# Patient Record
Sex: MALE | Race: BLACK OR AFRICAN AMERICAN | Employment: UNEMPLOYED | URBAN - METROPOLITAN AREA
[De-identification: names, ages, dates, MRNs, and addresses within clinical notes are randomized per-mention and may not be internally consistent; named-entity substitution may affect disease eponyms.]

---

## 2019-02-01 NOTE — TELEPHONE ENCOUNTER
FUTURE VISIT INFORMATION      FUTURE VISIT INFORMATION:    Date: 2/4/19    Time: 9:50AM    Location: [facility]   REFERRAL INFORMATION:    Referring provider:  Moe Bansal (NP),     Referring providers clinic:  [facility]    Reason for visit/diagnosis  Chronic throat pain    RECORDS REQUESTED FROM:       Clinic name Comments Records Status Imaging Status                                           2/1/19 8:04AM sent a fax to [facility] for records, did not receive the records that were faxed 1/8/19. Sending an additional fax to [facility] for records via [employer] - Amay     2/4/19 10AM called [facility] and left a message with medical records to release records - Amay  2/4/19 10:54AM called [facility], waited on hold for 12 minutes to get a hold of a nurse who will be faxing records on patient for today's appointment. REcieved records via [employer] and handed off to the nurse - Amay

## 2019-02-04 ENCOUNTER — PRE VISIT (OUTPATIENT)
Dept: OTOLARYNGOLOGY | Facility: CLINIC | Age: 29
End: 2019-02-04

## 2019-02-04 ENCOUNTER — OFFICE VISIT (OUTPATIENT)
Dept: OTOLARYNGOLOGY | Facility: CLINIC | Age: 29
End: 2019-02-04
Payer: COMMERCIAL

## 2019-02-04 DIAGNOSIS — R07.0 CHRONIC THROAT PAIN: Primary | ICD-10-CM

## 2019-02-04 DIAGNOSIS — R07.0 THROAT PAIN: ICD-10-CM

## 2019-02-04 DIAGNOSIS — K21.9 GASTROESOPHAGEAL REFLUX DISEASE, ESOPHAGITIS PRESENCE NOT SPECIFIED: ICD-10-CM

## 2019-02-04 DIAGNOSIS — R49.0 DYSPHONIA: ICD-10-CM

## 2019-02-04 DIAGNOSIS — G89.29 CHRONIC THROAT PAIN: Primary | ICD-10-CM

## 2019-02-04 DIAGNOSIS — R13.10 DYSPHAGIA: Primary | ICD-10-CM

## 2019-02-04 RX ORDER — TAMSULOSIN HYDROCHLORIDE 0.4 MG/1
0.4 CAPSULE ORAL DAILY
Refills: 0 | COMMUNITY
Start: 2018-11-24

## 2019-02-04 RX ORDER — FLUTICASONE PROPIONATE 50 MCG
SPRAY, SUSPENSION (ML) NASAL
Refills: 3 | COMMUNITY
Start: 2019-01-03

## 2019-02-04 NOTE — Clinical Note
"2/4/2019       RE: Ken Hernandez  [address]  [place] [place] [facility] [address]     Dear Colleague,    Thank you for referring your patient, Ken Hernandez, to the [facility] at [facility]. Please see a copy of my visit note below.    Dear Dr. Bansal:    I had the pleasure of meeting Ken Hernandez in consultation at the [facility] of the [facility] at your request, for evaluation of throat discomfort. The note from our visit follows. Speech recognition software may have been used in the documentation below; input is reviewed before signature to the best of my ability. I appreciate the opportunity to participate in the care of this pleasant patient.    Please feel free to contact me with any questions.    Sincerely yours,    Rosanne Bowman M.D., M.P.H.  , Laryngology  Director, [facility]  Otolaryngology- Head & Neck Surgery  [phone]          =====    History of Present Illness:   Ken Hernandez is a pleasant 29 year old male who presents with a 1 year history of throat concerns. Symptoms include {[facility] ENT VOICESX MISONO:068254}.    Voice  No concerns.     Swallowing  ***He has some swallow discomfort that has been going on for about 1 year. This is worst with cold liquids and hard foods. He has stopped drinking cold liquids as a result. The symptoms are also worse after heavy voice use. Problem is worsening over time. Things never feel like they get down the wrong tube. No prior swallow study. No preceding trauma.  He {DOES/DOES NOT:358163::\"does not\"} experience increased swallowing effort with {TEXTURES:[id number]}.    Cough/Throat-clearing  No concerns.     Breathing  No concerns.     Throat discomfort  ***also has had throat discomfort over the same period of time  Feels sharp and is always there  Does seem worse with meals    Reflux-type symptoms  He " experiences heartburn/indigestion daily. He is not taking reflux medications.  He reports a year of a reflux medication with no change in his symptoms.      Prior outside records were reviewed for this visit.    MEDICATIONS:     Current Outpatient Medications   Medication Sig Dispense Refill     fluticasone (FLONASE) 50 MCG/ACT nasal spray PLACE 2 SPRAYS BY NASAL ROUTE ONCE D  3     tamsulosin (FLOMAX) 0.4 MG capsule Take 0.4 mg by mouth daily  0       ALLERGIES:  No Known Allergies    PAST MEDICAL HISTORY: No past medical history on file.     PAST SURGICAL HISTORY: No past surgical history on file.    HABITS/SOCIAL HISTORY:    Social History     Tobacco Use     Smoking status: Not on file   Substance Use Topics     Alcohol use: Not on file         FAMILY HISTORY:  No family history on file. Noncontributory.    REVIEW OF SYSTEMS:  The patient completed a comprehensive 11 point review of systems (below), which was reviewed. Positives are as noted below; pertinent findings are as noted in the HPI.     Patient Supplied Answers to Review of Systems  No flowsheet data found.    PHYSICAL EXAMINATION:  General: The patient was alert and conversant, and in no acute distress. Patient accompanied by [facility] .  Eyes: PERRL, conjunctiva and lids normal, sclera nonicteric.  Nose: Anterior rhinoscopy: no gross abnormalities. no  bleeding; no  mucopurulence; septum grossly normal, mild mucoid drainage and/or crusting.  Oral cavity/oropharynx: No masses or lesions. Dentition in fair condition. Floor of mouth and oral tongue soft to palpation. Tongue mobility and palate elevation intact and symmetric.  Ears: Normal auricles, external auditory canals bilaterally. Visualized portions of tympanic membranes normal bilaterally.   Neck: No palpable cervical lymphadenopathy. There was moderate  tenderness to palpation of the thyrohyoid space, which was very narrow. No obvious thyroid abnormality. Landmarks palpable.  Resp:  Breathing comfortably, no stridor or stertor.  Neuro: Symmetric facial function. Other cranial nerves as documented above.  Psych: Normal affect, pleasant and cooperative.  Voice/speech: No significant dysphonia.  Extremities: No cyanosis, clubbing, or edema of the upper extremities.    Intake scores  Total Score for Last Patient-Answered VHI Questionnaire  No flowsheet data found.  Total Score for Last Patient-Answered EAT Questionnaire  No flowsheet data found.    Total Score for Last Patient-Answered CSI Questionnaire  No flowsheet data found.      PROCEDURE:   Flexible fiberoptic laryngoscopy  Indications: This procedure was warranted to evaluate the patient's laryngeal anatomy and function. Risks, benefits, and alternatives were discussed.  Description: After written informed consent was obtained, a time-out was performed to confirm patient identity, procedure, and procedure site. Topical 3% lidocaine with 0.25% phenylephrine was applied to the nasal cavities. I performed the endoscopy and no complications were apparent.  Performed by: Rosanne Bowman MD MPH  SLP: Saira Ferreira, PhD, CCC-SLP   Findings: Normal nasopharynx. Normal base of tongue, valleculae, and epiglottis. The right true vocal fold demonstrated normal mobility. The left true vocal fold demonstrated normal mobility. The medial edges of the vocal folds appeared smooth and straight. No focal mucosal lesions were observed on the true vocal folds. Glissade produced appropriate elongation. There was moderate to severe supraglottic recruitment with connected speech. Mucosa of the false vocal folds, aryepiglottic folds, piriform sinuses, and posterior glottis unremarkable. Airway was patent.   No focal lesions on NBI.          IMPRESSION AND PLAN:   Ken Hernandez presents with chronic throat pain that is worse with swallowing cold liquids/solids and with voice use. On exam he has significant supraglottic hyperfunction. I recommended speech  therapy as initial primary management, with goals including reducing laryngeal irritability and improving laryngeal efficiency.     He also reported daily acid reflux symptoms in chest, did not respond to a year-long course of prior anti-reflux medications. I recommended a GI evaluation given this ongoing problem, which likely exacerbates his throat symptom as well.    He will return as needed. I appreciate the opportunity to participate in the care of this patient.             Again, thank you for allowing me to participate in the care of your patient.      Sincerely,    Rosanne Bowman MD

## 2019-02-04 NOTE — PATIENT INSTRUCTIONS
1.  You were seen in the ENT Clinic today by Dr. Bowman.  If you have any questions or concerns after your appointment, please call [phone].    2.  Plan is to return to clinic as needed.    Clinic contact information:   1. To schedule an appointment or to speak to someone regarding your medical care, please call [phone], option #1   2. Alisson RN:[phone]   3. Surgery scheduling (if applicable):       Alyssa Garay: [phone]    4. Fax: [phone]   5. [phone]

## 2019-02-04 NOTE — LETTER
2/4/2019      RE: Ken Hernandez  [address]  [facility] [address]       Dear Dr. Bansal:    I had the pleasure of meeting Ken Hernandez in consultation at the [facility] of the [facility] at your request, for evaluation of throat discomfort. The note from our visit follows. Speech recognition software may have been used in the documentation below; input is reviewed before signature to the best of my ability. I appreciate the opportunity to participate in the care of this pleasant patient.    =====    History of Present Illness:   Ken Hernandez is a pleasant 29-year-old male who presents with a one year history of throat concerns.       Voice  No concerns.       Swallowing  He has some swallow discomfort that has been going on for about one year. This is worse with cold liquids and hard foods. He has stopped drinking cold liquids as a result. The symptoms are also worse after heavy voice use. He notes the problem is worsening over time. Things never feel like they get down the wrong tube. He has had no prior swallow study. No preceding trauma.      Cough/Throat-clearing  No concerns.       Breathing  No concerns.       Throat discomfort  He also reports he has had throat discomfort over the same period of time.  It feels sharp and is always there.  He notes it seems worse with meals.       Reflux-type symptoms  He experiences heartburn/indigestion daily. He is not taking reflux medications.  He reports a year of a reflux medication with no change in his symptoms.      Prior outside records were reviewed for this visit.      MEDICATIONS:     Current Outpatient Medications   Medication Sig Dispense Refill     fluticasone (FLONASE) 50 MCG/ACT nasal spray PLACE 2 SPRAYS BY NASAL ROUTE ONCE D  3     tamsulosin (FLOMAX) 0.4 MG capsule Take 0.4 mg by mouth daily  0       ALLERGIES:  No Known Allergies    PAST MEDICAL HISTORY: No past medical history on file.     PAST  SURGICAL HISTORY: No past surgical history on file.    HABITS/SOCIAL HISTORY:    Social History     Tobacco Use     Smoking status: Not on file   Substance Use Topics     Alcohol use: Not on file         FAMILY HISTORY:  No family history on file. Noncontributory.    REVIEW OF SYSTEMS:  The patient completed a comprehensive 11 point review of systems (below), which was reviewed. Positives are as noted below; pertinent findings are as noted in the HPI.     Patient Supplied Answers to Review of Systems  No flowsheet data found.    PHYSICAL EXAMINATION:  General: The patient was alert and conversant, and in no acute distress. Patient accompanied by [facility] .  Eyes: PERRL, conjunctiva and lids normal, sclera nonicteric.  Nose: Anterior rhinoscopy: no gross abnormalities. no  bleeding; no  mucopurulence; septum grossly normal, mild mucoid drainage and/or crusting.  Oral cavity/oropharynx: No masses or lesions. Dentition in fair condition. Floor of mouth and oral tongue soft to palpation. Tongue mobility and palate elevation intact and symmetric.  Ears: Normal auricles, external auditory canals bilaterally. Visualized portions of tympanic membranes normal bilaterally.   Neck: No palpable cervical lymphadenopathy. There was moderate  tenderness to palpation of the thyrohyoid space, which was very narrow. No obvious thyroid abnormality. Landmarks palpable.  Resp: Breathing comfortably, no stridor or stertor.  Neuro: Symmetric facial function. Other cranial nerves as documented above.  Psych: Normal affect, pleasant and cooperative.  Voice/speech: No significant dysphonia.  Extremities: No cyanosis, clubbing, or edema of the upper extremities.    Intake scores  Total Score for Last Patient-Answered VHI Questionnaire  No flowsheet data found.  Total Score for Last Patient-Answered EAT Questionnaire  No flowsheet data found.    Total Score for Last Patient-Answered CSI Questionnaire  No flowsheet data  found.      PROCEDURE:   Flexible fiberoptic laryngoscopy  Indications: This procedure was warranted to evaluate the patient's laryngeal anatomy and function. Risks, benefits, and alternatives were discussed.  Description: After written informed consent was obtained, a time-out was performed to confirm patient identity, procedure, and procedure site. Topical 3% lidocaine with 0.25% phenylephrine was applied to the nasal cavities. I performed the endoscopy and no complications were apparent.  Performed by: Rosanne Bowman MD MPH  SLP: Saira Ferreira, PhD, CCC-SLP   Findings: Normal nasopharynx. Normal base of tongue, valleculae, and epiglottis. The right true vocal fold demonstrated normal mobility. The left true vocal fold demonstrated normal mobility. The medial edges of the vocal folds appeared smooth and straight. No focal mucosal lesions were observed on the true vocal folds. Glissade produced appropriate elongation. There was moderate to severe supraglottic recruitment with connected speech. Mucosa of the false vocal folds, aryepiglottic folds, piriform sinuses, and posterior glottis unremarkable. Airway was patent.   No focal lesions on NBI.          IMPRESSION AND PLAN:   Ken Hernandez presents with chronic throat pain that is worse with swallowing cold liquids/solids and with voice use. On exam, he has significant supraglottic hyperfunction.     I recommended speech therapy as initial primary management, with goals including reducing laryngeal irritability and improving laryngeal efficiency.     He also reported daily acid reflux symptoms in his chest that did not respond to a year-long course of prior anti-reflux medications. I recommended a Gastroenterology evaluation given this ongoing problem, which likely exacerbates his throat symptom as well.    He will return as needed. I appreciate the opportunity to participate in the care of this patient.       Rosanne Bowman MD

## 2019-02-04 NOTE — LETTER
2/4/2019       RE: Ken Hernandez  [address]  [place] [address]     Dear Colleague,    Thank you for referring your patient, Ken Hernandez, to the [facility]. Please see a copy of my visit note below.    [facility]  Patrick Peters Jr., M.D., F.A.C.S.  Rosanne Bowman M.D., M.P.H.  Saira Ferreira, Ph.D., CCC/SLP  Marianna Infante M.M. (voice), M.A., CCC/SLP  Blu Marina M.M. (voice), M.A., CCC/SLP    [facility]  VOICE/SPEECH/BREATHING EVALUATION AND LARYNGEAL EXAMINATION REPORT    Patient: Ken Hernandez  Date of Visit: 2/4/2019    Clinician: Saira Ferreira, Ph.D., CCC/SLP  Seen in conjunction with: Dr. Bowman    CHIEF COMPLAINT:  swallow    HISTORY  Ken Hernandez is a 29 year old presenting today for evaluation of throat concerns, including effortful swallowing.    Please refer to Dr. Bowman s dictation for a more complete history and impressions.   Salient details of his history are as follows:    Onset: a year ago, no obvious precipitating event    swallowing is uncomfortable and effortful, mostly with cold liquids and some solids    No actual aspiration    Throat discomfort; worse with meals, unrelated to voice use    DIAGNOSIS/REASON FOR REFERRAL  Dysphagia/ Evaluate, perform laryngeal exam, treat as appropriate      CURRENT PATIENT COMPLAINTS    Primary: swallow    Secondary: throat pain    Denies significant dyspnea, dysphonia, or pain    OTHER PERTINENT HISTORY    He does have symptoms of reflux, and has not had a full GI evaluation    Otherwise unknown; please refer to Dr. Bowman's note    OBJECTIVE FINDINGS  VOICE/ SPEECH/ NON-COMMUNICATIVE LARYNGEAL BEHAVIORS EVALUATION  An evaluation of the voice/throat was accomplished today; salient features are as follows:    Palpation of the laryngeal area shows:    No tenderness of the thyrohyoid area     Severely reduced thyrohyoid space     Additional closure of the  thyrohyoid space on phonation is not possible    Breathing pattern:    Appears within normal limits and adequate     Tension is evident:    Neck    Voice quality is characterized by    Backward resonance, with apparent tongue-base tension    Habitual pitch was not formally tested, but is judged to be WNL and appropriate    Loudness is WNL and appropriate for the setting    Sustained vowels: WNL    In a pitch glide task to determine pitch range he shows normal pitch range with some prompting    LARYNGEAL EXAMINATION    Endoscopic laryngeal examination was performed by:  Rosanne Bowman MD,   I provided technical support, and provided the protocol of instructions for the patient.  Type of exam:   Flexible endoscopy with chip-tip technology  The laryngeal and pharyngeal structures were evaluated for gross appearance, mobility, function, and focal lesions / abnormalities of the associated mucosa.    All findings were within normal limits with the exception of the following salient features:     Moderate presence of thickened secretions throughout the laryngeal area    Status of vocal fold mucosa:   o Mildly dry  o Otherwise within normal limits, with no lesions and straight vibratory margins    Incomplete abduction is evident during a task of 20 quickly repeated vowels  o Airway is WNL, but never as wide as it could be  o Breathing appears somewhat labored or effortful    Moderate to marked four-way constriction of the supraglottic larynx during connected speech    Too uncomfortable with the exam to do therapy probes; language also a barrier    Dr. Bowman and I reviewed this laryngeal exam with Mr. Hernandez today, and I provided pertinent explanations, as well as written information:    Endoscopic findings are consistent with audio-perceptual assessment and patient Hx/complaints.    his symptoms are accounted for by hyperfunction and constriction in the supraglottic area associated with phonation, and possibly with  resting postures as well     Some symptoms may be caused by, or exacerbated by, reflux; a GI consult is recommended    Because there appears to be a functional component to his symptoms, he would most likely benefit from functional speech therapy.    He may opt to pursue the GI consult first    ASSESSMENT / PLAN  IMPRESSIONS:  This evaluation has resulted in the following diagnosis/diagnoses for Mr. Hernandez  R13.10 (Dysphagia)  R07.0 (Throat Pain)  R49.0 (Dysphonia).      Laryngeal evaluation demonstrated muscle tension and hyperfunction    Perceptual evaluation demonstrated backward resonance  Discomfort is partly accounted for by the hyperfunction of the intrinsic and extrinsic laryngeal musculature    Contributions from reflux are unknown; Dr. Bowman has recommended a GI consult   Because there appears to be a functional component to his symptoms, he would most likely benefit from a course of functional speech therapy  RECOMMENDATIONS:     A course of speech therapy is recommended to promote reduced discomfort and dysphagia (effortful swallow)    He demonstrates a Guarded prognosis for improvement given adherence to therapeutic recommendations. Therapeutic     Positive indicators: diagnosis is known to respond to functional treatment;     Negative indicators: language barrier; does not seem interested in therapy    TREATMENT PLAN  Speech therapy if offered but not planned; if he schedules therapy, goals will be established at the first session    TOTAL SERVICE TIME: 20 minutes  EVALUATION OF VOICE AND RESONANCE (06385)  NO CHARGE FACILITY FEE (87294)    Saira Ferreira, Ph.D., [facility]-SLP  Speech-Language Pathologist  Director, [facility]  [phone]

## 2019-02-04 NOTE — PROGRESS NOTES
[facility] VOICE [facility]  Patrick Peters Jr., M.D., F.A.C.S.  Rosanne Bowman M.D., M.P.H.  Saira Ferreira, Ph.D., CCC/SLP  Marianna Infante M.M. (voice), MJENN., CCC/SLP  Blu Marina M.M. (voice), M.ANNEL., CCC/SLP    [facility]  VOICE/SPEECH/BREATHING EVALUATION AND LARYNGEAL EXAMINATION REPORT    Patient: Ken Henrandez  Date of Visit: 2/4/2019    Clinician: Saira Ferreira, Ph.D., CCC/SLP  Seen in conjunction with: Dr. Bowman    CHIEF COMPLAINT:  swallow    HISTORY  Ken Hernandez is a 29 year old presenting today for evaluation of throat concerns, including effortful swallowing.    Please refer to Dr. Bowman s dictation for a more complete history and impressions.   Salient details of his history are as follows:    Onset: a year ago, no obvious precipitating event    swallowing is uncomfortable and effortful, mostly with cold liquids and some solids    No actual aspiration    Throat discomfort; worse with meals, unrelated to voice use    DIAGNOSIS/REASON FOR REFERRAL  Dysphagia/ Evaluate, perform laryngeal exam, treat as appropriate      CURRENT PATIENT COMPLAINTS    Primary: swallow    Secondary: throat pain    Denies significant dyspnea, dysphonia, or pain    OTHER PERTINENT HISTORY    He does have symptoms of reflux, and has not had a full GI evaluation    Otherwise unknown; please refer to Dr. Bowman's note      OBJECTIVE FINDINGS  VOICE/ SPEECH/ NON-COMMUNICATIVE LARYNGEAL BEHAVIORS EVALUATION  An evaluation of the voice/throat was accomplished today; salient features are as follows:    Palpation of the laryngeal area shows:    No tenderness of the thyrohyoid area     Severely reduced thyrohyoid space     Additional closure of the thyrohyoid space on phonation is not possible    Breathing pattern:    Appears within normal limits and adequate     Tension is evident:    Neck    Voice quality is characterized by    Backward resonance, with apparent tongue-base tension    Habitual pitch was not formally  tested, but is judged to be WNL and appropriate    Loudness is WNL and appropriate for the setting    Sustained vowels: WNL    In a pitch glide task to determine pitch range he shows normal pitch range with some prompting      LARYNGEAL EXAMINATION    Endoscopic laryngeal examination was performed by:  Rosanne Bowman MD,   I provided technical support, and provided the protocol of instructions for the patient.  Type of exam:   Flexible endoscopy with chip-tip technology  The laryngeal and pharyngeal structures were evaluated for gross appearance, mobility, function, and focal lesions / abnormalities of the associated mucosa.    All findings were within normal limits with the exception of the following salient features:     Moderate presence of thickened secretions throughout the laryngeal area    Status of vocal fold mucosa:   o Mildly dry  o Otherwise within normal limits, with no lesions and straight vibratory margins    Incomplete abduction is evident during a task of 20 quickly repeated vowels  o Airway is WNL, but never as wide as it could be  o Breathing appears somewhat labored or effortful    Moderate to marked four-way constriction of the supraglottic larynx during connected speech    Too uncomfortable with the exam to do therapy probes; language also a barrier    Dr. Bowman and I reviewed this laryngeal exam with Mr. Hernandez today, and I provided pertinent explanations, as well as written information:    Endoscopic findings are consistent with audio-perceptual assessment and patient Hx/complaints.    his symptoms are accounted for by hyperfunction and constriction in the supraglottic area associated with phonation, and possibly with resting postures as well     Some symptoms may be caused by, or exacerbated by, reflux; a GI consult is recommended    Because there appears to be a functional component to his symptoms, he would most likely benefit from functional speech therapy.    He may opt to pursue the  GI consult first    ASSESSMENT / PLAN  IMPRESSIONS:  This evaluation has resulted in the following diagnosis/diagnoses for Mr. Hernandez  R13.10 (Dysphagia)  R07.0 (Throat Pain)  R49.0 (Dysphonia).      Laryngeal evaluation demonstrated muscle tension and hyperfunction    Perceptual evaluation demonstrated backward resonance  Discomfort is partly accounted for by the hyperfunction of the intrinsic and extrinsic laryngeal musculature    Contributions from reflux are unknown; Dr. Bowman has recommended a GI consult   Because there appears to be a functional component to his symptoms, he would most likely benefit from a course of functional speech therapy  RECOMMENDATIONS:     A course of speech therapy is recommended to promote reduced discomfort and dysphagia (effortful swallow)    He demonstrates a Guarded prognosis for improvement given adherence to therapeutic recommendations. Therapeutic     Positive indicators: diagnosis is known to respond to functional treatment;     Negative indicators: language barrier; does not seem interested in therapy    TREATMENT PLAN  Speech therapy if offered but not planned; if he schedules therapy, goals will be established at the first session    TOTAL SERVICE TIME: 20 minutes  EVALUATION OF VOICE AND RESONANCE (26285)  NO CHARGE FACILITY FEE (15239)      Saira Ferreira, Ph.D., [facility]-SLP  Speech-Language Pathologist  Director, [facility]  [phone]

## 2019-02-04 NOTE — PROGRESS NOTES
Dear Dr. Bansal:    I had the pleasure of meeting Ken Hernandez in consultation at the [facility] of the [facility] at your request, for evaluation of throat discomfort. The note from our visit follows. Speech recognition software may have been used in the documentation below; input is reviewed before signature to the best of my ability. I appreciate the opportunity to participate in the care of this pleasant patient.    Please feel free to contact me with any questions.    Sincerely yours,    Rosanne Bowman M.D., M.P.H.  , Laryngology  Director, [facility]  Otolaryngology- Head & Neck Surgery  [phone]          =====    History of Present Illness:   Ken Hernandez is a pleasant 29-year-old male who presents with a one year history of throat concerns.       Voice  No concerns.       Swallowing  He has some swallow discomfort that has been going on for about one year. This is worse with cold liquids and hard foods. He has stopped drinking cold liquids as a result. The symptoms are also worse after heavy voice use. He notes the problem is worsening over time. Things never feel like they get down the wrong tube. He has had no prior swallow study. No preceding trauma.      Cough/Throat-clearing  No concerns.       Breathing  No concerns.       Throat discomfort  He also reports he has had throat discomfort over the same period of time.  It feels sharp and is always there.  He notes it seems worse with meals.       Reflux-type symptoms  He experiences heartburn/indigestion daily. He is not taking reflux medications.  He reports a year of a reflux medication with no change in his symptoms.      Prior outside records were reviewed for this visit.      MEDICATIONS:     Current Outpatient Medications   Medication Sig Dispense Refill     fluticasone (FLONASE) 50 MCG/ACT nasal spray PLACE 2 SPRAYS BY NASAL ROUTE ONCE D  3      tamsulosin (FLOMAX) 0.4 MG capsule Take 0.4 mg by mouth daily  0       ALLERGIES:  No Known Allergies    PAST MEDICAL HISTORY: No past medical history on file.     PAST SURGICAL HISTORY: No past surgical history on file.    HABITS/SOCIAL HISTORY:    Social History     Tobacco Use     Smoking status: Not on file   Substance Use Topics     Alcohol use: Not on file         FAMILY HISTORY:  No family history on file. Noncontributory.    REVIEW OF SYSTEMS:  The patient completed a comprehensive 11 point review of systems (below), which was reviewed. Positives are as noted below; pertinent findings are as noted in the HPI.     Patient Supplied Answers to Review of Systems  No flowsheet data found.    PHYSICAL EXAMINATION:  General: The patient was alert and conversant, and in no acute distress. Patient accompanied by [facility] .  Eyes: PERRL, conjunctiva and lids normal, sclera nonicteric.  Nose: Anterior rhinoscopy: no gross abnormalities. no  bleeding; no  mucopurulence; septum grossly normal, mild mucoid drainage and/or crusting.  Oral cavity/oropharynx: No masses or lesions. Dentition in fair condition. Floor of mouth and oral tongue soft to palpation. Tongue mobility and palate elevation intact and symmetric.  Ears: Normal auricles, external auditory canals bilaterally. Visualized portions of tympanic membranes normal bilaterally.   Neck: No palpable cervical lymphadenopathy. There was moderate  tenderness to palpation of the thyrohyoid space, which was very narrow. No obvious thyroid abnormality. Landmarks palpable.  Resp: Breathing comfortably, no stridor or stertor.  Neuro: Symmetric facial function. Other cranial nerves as documented above.  Psych: Normal affect, pleasant and cooperative.  Voice/speech: No significant dysphonia.  Extremities: No cyanosis, clubbing, or edema of the upper extremities.    Intake scores  Total Score for Last Patient-Answered VHI Questionnaire  No flowsheet data found.  Total  Score for Last Patient-Answered EAT Questionnaire  No flowsheet data found.    Total Score for Last Patient-Answered CSI Questionnaire  No flowsheet data found.      PROCEDURE:   Flexible fiberoptic laryngoscopy  Indications: This procedure was warranted to evaluate the patient's laryngeal anatomy and function. Risks, benefits, and alternatives were discussed.  Description: After written informed consent was obtained, a time-out was performed to confirm patient identity, procedure, and procedure site. Topical 3% lidocaine with 0.25% phenylephrine was applied to the nasal cavities. I performed the endoscopy and no complications were apparent.  Performed by: Rosanne Bowman MD MPH  SLP: Saira Ferreira, PhD, CCC-SLP   Findings: Normal nasopharynx. Normal base of tongue, valleculae, and epiglottis. The right true vocal fold demonstrated normal mobility. The left true vocal fold demonstrated normal mobility. The medial edges of the vocal folds appeared smooth and straight. No focal mucosal lesions were observed on the true vocal folds. Glissade produced appropriate elongation. There was moderate to severe supraglottic recruitment with connected speech. Mucosa of the false vocal folds, aryepiglottic folds, piriform sinuses, and posterior glottis unremarkable. Airway was patent.   No focal lesions on NBI.          IMPRESSION AND PLAN:   Ken Hernandez presents with chronic throat pain that is worse with swallowing cold liquids/solids and with voice use. On exam, he has significant supraglottic hyperfunction.     I recommended speech therapy as initial primary management, with goals including reducing laryngeal irritability and improving laryngeal efficiency.     He also reported daily acid reflux symptoms in his chest that did not respond to a year-long course of prior anti-reflux medications. I recommended a Gastroenterology evaluation given this ongoing problem, which likely exacerbates his throat symptom as  well.    He will return as needed. I appreciate the opportunity to participate in the care of this patient.

## 2019-02-04 NOTE — PROGRESS NOTES
[facility] VOICE CLINIC  Evaluation report    Clinician: Alvaro Marina M.M., M.A., CCC/SLP  Seen in conjunction with: Dr. Bowman  Referring physician:  Dr. Bansal  Patient: Ken Hernandez  Date of Visit: 2019    HISTORY  Chief complaint: Ken Hernandez is a 29 year old *** presenting today for evaluation of ***.    Onset: {Evaluation - onset:922695}  Inciting incident: ***  Course: {Evaluation - course:986689}  Salient history: He has a history significant for ***.    CURRENT SYMPTOMS INCLUDE  ***VOICE    ***    ***COUGH/THROAT CLEARING    ***     his cough/ throat clearing triggers include:  o ***    ***SWALLOWING    ***    ***BREATHING    ***    ***ADDITIONAL    ***    Patient denies significant {[facility] Basic Symptoms:293239}.     OTHER PERTINENT HISTORY    {HISTORY:163976487}    No past medical history on file.  No past surgical history on file.    OBJECTIVE  PATIENT REPORTED MEASURES    {[facility] PATIENT REPORTED MEASURES:683806}  Patient Supplied Answers To VHI Questionnaire  No flowsheet data found.    Patient Supplied Answers To CSI Questionnaire  No flowsheet data found.    Patient Supplied Answers To EAT Questionnaire  No flowsheet data found.        PERCEPTUAL EVALUATION (CPT 90237)  POSTURE / TENSION:     {Perceptual Tension:019235}    BREATHING:     {Perceptual Breathin}    LARYNGEAL PALPATION:     {Laryngeal Palpation:863799}    VOICE:    Roughness: {[facility] VOICE SEVERITY RATINGS:364649}    Breathiness: {[facility] VOICE SEVERITY RATINGS:908840}    Strain: {[facility] VOICE SEVERITY RATINGS:650668}    ***{Other Perceptual Categories:190204}    Loudness    Conversational speech:  {Perceptual Loudness:282635}    Projected speech:  {Perceptual Loudness:387057}    Pitch:    Conversational speech:  {Perceptual Pitch:117793}    Pitch glide: ***    Resonance:    Conversational speech:  {Perceptual Resonance:182735}    Singing vs. Speech:  ***    CAPE-V Overall Severity:  ***/100    COUGH/THROAT CLEARING:    {[facility]  "SX-COUGH:667677}    THERAPY PROBES: Improvement was elicited with {Therapy Probe Response:297090}    ***    ASSESSMENT / PLAN  IMPRESSIONS: Ken Hernandez is presenting today with {[facility] E[facility]-ICD-10 CODES:985107} in the context of {[facility] ENT-ICD-10 CODES:513892}. ***{DYSPHONIA:045807430}.    STIMULABILITY: results of therapy probes during perceptual and laryngeal evaluation demonstrate improvement with {Therapy Probe Response:037668}    RECOMMENDATIONS:     {Therapy recommendations:344250}    He demonstrates a {PROGNOSIS:771082516::\"Good\"} prognosis for improvement given adherence to therapeutic recommendations.     Positive indicators: {[facility] Positive Prognostic Indicators:491792}    Negative indicators: ***    DURATION / FREQUENCY: *** one-hour sessions    ***GOALS:  Patient goal:   1. {GOALS:931117570}    Short-term goal(s): Within the first 4 sessions, Mr. Hernandez:  1. {DWLVCGOALS:804894}    Long-term goal(s): In *** months, Mr. Hernandez will:  1. {LONG TERM GOALS:132553927}    ***  G-Codes:  {[facility] ENT-G-CODES LIST:121273}  Certification period: February 4, 2019 - [unfilled]    This treatment plan was developed with the patient who agreed with the recommendations.    ***SAMEDAY THERAPY NOTE GOES HERE, USE .DWSPTXSAMEDAY***    TOTAL SERVICE TIME: *** minutes  {LOS:178030::\"NO CHARGE FACILITY FEE (15315)\"}    Alvaro Marina M.M., M.A., CCC-SLP  Speech-Language Pathologist  Certificate of Vocology  [phone]    *this report was created in part through the use of computerized dictation software, and though reviewed following completion, some typographic errors may persist.  If there is confusion regarding any of this notes contents, please contact me for clarification.*    "

## 2019-02-22 ENCOUNTER — HOSPITAL ENCOUNTER (OUTPATIENT)
Dept: GENERAL RADIOLOGY | Facility: CLINIC | Age: 29
Discharge: HOME | End: 2019-02-22
Attending: NURSE PRACTITIONER | Admitting: NURSE PRACTITIONER
Payer: COMMERCIAL

## 2019-02-22 ENCOUNTER — OFFICE VISIT (OUTPATIENT)
Dept: INTERPRETER SERVICES | Facility: CLINIC | Age: 29
End: 2019-02-22
Payer: COMMERCIAL

## 2019-02-22 DIAGNOSIS — R76.12 POSITIVE QUANTIFERON-TB GOLD TEST: ICD-10-CM

## 2019-02-22 PROCEDURE — 71046 X-RAY EXAM CHEST 2 VIEWS: CPT

## 2019-02-22 PROCEDURE — T1013 SIGN LANG/ORAL INTERPRETER: HCPCS | Mod: U3

## 2020-03-10 ENCOUNTER — TRANSFERRED RECORDS (OUTPATIENT)
Dept: PHYSICAL THERAPY | Facility: CLINIC | Age: 30
End: 2020-03-10

## 2021-05-10 ENCOUNTER — TRANSCRIBE ORDERS (OUTPATIENT)
Dept: OTHER | Age: 31
End: 2021-05-10

## 2021-05-10 DIAGNOSIS — M54.2 NECK PAIN: Primary | ICD-10-CM

## 2024-09-20 ENCOUNTER — LAB REQUISITION (OUTPATIENT)
Dept: LAB | Facility: CLINIC | Age: 34
End: 2024-09-20
Payer: COMMERCIAL

## 2024-09-20 DIAGNOSIS — Z13.220 ENCOUNTER FOR SCREENING FOR LIPOID DISORDERS: ICD-10-CM

## 2024-09-20 LAB
CHOLEST SERPL-MCNC: 168 MG/DL
FASTING STATUS PATIENT QL REPORTED: NO
HDLC SERPL-MCNC: 35 MG/DL
LDLC SERPL CALC-MCNC: 85 MG/DL
NONHDLC SERPL-MCNC: 133 MG/DL
TRIGL SERPL-MCNC: 239 MG/DL

## 2024-09-20 PROCEDURE — 80061 LIPID PANEL: CPT | Mod: ORL | Performed by: INTERNAL MEDICINE

## 2025-07-10 ENCOUNTER — LAB REQUISITION (OUTPATIENT)
Dept: LAB | Facility: CLINIC | Age: 35
End: 2025-07-10
Payer: COMMERCIAL

## 2025-07-10 DIAGNOSIS — Z00.00 ENCOUNTER FOR GENERAL ADULT MEDICAL EXAMINATION WITHOUT ABNORMAL FINDINGS: ICD-10-CM

## 2025-07-10 LAB — HCV AB SERPL QL IA: NONREACTIVE

## 2025-07-10 PROCEDURE — 87491 CHLMYD TRACH DNA AMP PROBE: CPT | Mod: ORL | Performed by: NURSE PRACTITIONER

## 2025-07-11 LAB
C TRACH DNA SPEC QL PROBE+SIG AMP: NEGATIVE
N GONORRHOEA DNA SPEC QL NAA+PROBE: NEGATIVE
SPECIMEN TYPE: NORMAL